# Patient Record
Sex: FEMALE | Race: ASIAN | NOT HISPANIC OR LATINO
[De-identification: names, ages, dates, MRNs, and addresses within clinical notes are randomized per-mention and may not be internally consistent; named-entity substitution may affect disease eponyms.]

---

## 2017-03-21 ENCOUNTER — APPOINTMENT (OUTPATIENT)
Dept: OBGYN | Facility: CLINIC | Age: 42
End: 2017-03-21

## 2017-03-21 VITALS
WEIGHT: 145 LBS | HEIGHT: 60 IN | BODY MASS INDEX: 28.47 KG/M2 | SYSTOLIC BLOOD PRESSURE: 100 MMHG | DIASTOLIC BLOOD PRESSURE: 70 MMHG

## 2017-03-22 LAB — HPV HIGH+LOW RISK DNA PNL CVX: NEGATIVE

## 2017-03-22 RX ORDER — MULTIVITAMIN
CAPSULE ORAL
Refills: 0 | Status: ACTIVE | COMMUNITY
Start: 2017-03-22

## 2017-03-24 LAB — CYTOLOGY CVX/VAG DOC THIN PREP: NORMAL

## 2017-05-13 ENCOUNTER — APPOINTMENT (OUTPATIENT)
Dept: MAMMOGRAPHY | Facility: CLINIC | Age: 42
End: 2017-05-13

## 2017-05-13 ENCOUNTER — APPOINTMENT (OUTPATIENT)
Dept: ULTRASOUND IMAGING | Facility: CLINIC | Age: 42
End: 2017-05-13

## 2017-05-13 ENCOUNTER — OUTPATIENT (OUTPATIENT)
Dept: OUTPATIENT SERVICES | Facility: HOSPITAL | Age: 42
LOS: 1 days | End: 2017-05-13
Payer: COMMERCIAL

## 2017-05-13 DIAGNOSIS — Z00.8 ENCOUNTER FOR OTHER GENERAL EXAMINATION: ICD-10-CM

## 2017-05-13 PROCEDURE — 77063 BREAST TOMOSYNTHESIS BI: CPT

## 2017-05-13 PROCEDURE — 76641 ULTRASOUND BREAST COMPLETE: CPT

## 2017-05-13 PROCEDURE — 76856 US EXAM PELVIC COMPLETE: CPT

## 2017-05-13 PROCEDURE — 76830 TRANSVAGINAL US NON-OB: CPT

## 2017-05-13 PROCEDURE — 77067 SCR MAMMO BI INCL CAD: CPT

## 2017-05-30 ENCOUNTER — APPOINTMENT (OUTPATIENT)
Dept: OBGYN | Facility: CLINIC | Age: 42
End: 2017-05-30

## 2017-05-30 VITALS
DIASTOLIC BLOOD PRESSURE: 66 MMHG | HEIGHT: 60 IN | WEIGHT: 146 LBS | BODY MASS INDEX: 28.66 KG/M2 | SYSTOLIC BLOOD PRESSURE: 102 MMHG

## 2017-05-30 DIAGNOSIS — R10.2 PELVIC AND PERINEAL PAIN: ICD-10-CM

## 2017-05-30 DIAGNOSIS — Z71.89 OTHER SPECIFIED COUNSELING: ICD-10-CM

## 2017-05-31 PROBLEM — Z71.89 ENCOUNTER TO DISCUSS TEST RESULTS: Status: ACTIVE | Noted: 2017-05-31

## 2018-05-01 ENCOUNTER — APPOINTMENT (OUTPATIENT)
Dept: OBGYN | Facility: CLINIC | Age: 43
End: 2018-05-01
Payer: COMMERCIAL

## 2018-05-01 VITALS
DIASTOLIC BLOOD PRESSURE: 60 MMHG | SYSTOLIC BLOOD PRESSURE: 92 MMHG | BODY MASS INDEX: 28.66 KG/M2 | HEIGHT: 60 IN | WEIGHT: 146 LBS

## 2018-05-01 DIAGNOSIS — Z01.419 ENCOUNTER FOR GYNECOLOGICAL EXAMINATION (GENERAL) (ROUTINE) W/OUT ABNORMAL FINDINGS: ICD-10-CM

## 2018-05-01 PROCEDURE — 82270 OCCULT BLOOD FECES: CPT

## 2018-05-01 PROCEDURE — 99396 PREV VISIT EST AGE 40-64: CPT

## 2018-05-03 LAB — HPV HIGH+LOW RISK DNA PNL CVX: NOT DETECTED

## 2018-05-05 LAB — CYTOLOGY CVX/VAG DOC THIN PREP: NORMAL

## 2018-05-28 ENCOUNTER — FORM ENCOUNTER (OUTPATIENT)
Age: 43
End: 2018-05-28

## 2018-05-29 ENCOUNTER — APPOINTMENT (OUTPATIENT)
Dept: MAMMOGRAPHY | Facility: IMAGING CENTER | Age: 43
End: 2018-05-29
Payer: COMMERCIAL

## 2018-05-29 ENCOUNTER — OUTPATIENT (OUTPATIENT)
Dept: OUTPATIENT SERVICES | Facility: HOSPITAL | Age: 43
LOS: 1 days | End: 2018-05-29
Payer: COMMERCIAL

## 2018-05-29 DIAGNOSIS — Z00.8 ENCOUNTER FOR OTHER GENERAL EXAMINATION: ICD-10-CM

## 2018-05-29 PROCEDURE — 77063 BREAST TOMOSYNTHESIS BI: CPT

## 2018-05-29 PROCEDURE — 77067 SCR MAMMO BI INCL CAD: CPT

## 2018-05-29 PROCEDURE — 77063 BREAST TOMOSYNTHESIS BI: CPT | Mod: 26

## 2018-05-29 PROCEDURE — 77067 SCR MAMMO BI INCL CAD: CPT | Mod: 26

## 2018-05-30 DIAGNOSIS — R92.2 INCONCLUSIVE MAMMOGRAM: ICD-10-CM

## 2018-06-06 ENCOUNTER — APPOINTMENT (OUTPATIENT)
Dept: ORTHOPEDIC SURGERY | Facility: CLINIC | Age: 43
End: 2018-06-06

## 2018-06-11 ENCOUNTER — APPOINTMENT (OUTPATIENT)
Dept: ORTHOPEDIC SURGERY | Facility: CLINIC | Age: 43
End: 2018-06-11
Payer: COMMERCIAL

## 2018-06-11 DIAGNOSIS — M22.2X2 PATELLOFEMORAL DISORDERS, RIGHT KNEE: ICD-10-CM

## 2018-06-11 DIAGNOSIS — M22.2X1 PATELLOFEMORAL DISORDERS, RIGHT KNEE: ICD-10-CM

## 2018-06-11 PROCEDURE — 73110 X-RAY EXAM OF WRIST: CPT | Mod: RT

## 2018-06-11 PROCEDURE — 99203 OFFICE O/P NEW LOW 30 MIN: CPT

## 2018-06-11 PROCEDURE — 73564 X-RAY EXAM KNEE 4 OR MORE: CPT | Mod: 50

## 2018-06-12 PROBLEM — M22.2X1 PATELLOFEMORAL SYNDROME, BILATERAL: Status: ACTIVE | Noted: 2018-06-12

## 2018-06-19 ENCOUNTER — APPOINTMENT (OUTPATIENT)
Dept: OBGYN | Facility: CLINIC | Age: 43
End: 2018-06-19

## 2018-06-19 VITALS
BODY MASS INDEX: 29.25 KG/M2 | WEIGHT: 149 LBS | SYSTOLIC BLOOD PRESSURE: 120 MMHG | HEIGHT: 60 IN | DIASTOLIC BLOOD PRESSURE: 76 MMHG

## 2018-06-19 RX ORDER — LEVOTHYROXINE SODIUM 200 MCG
VIAL (EA) INTRAVENOUS
Refills: 0 | Status: ACTIVE | COMMUNITY

## 2018-06-27 ENCOUNTER — APPOINTMENT (OUTPATIENT)
Dept: ORTHOPEDIC SURGERY | Facility: CLINIC | Age: 43
End: 2018-06-27
Payer: COMMERCIAL

## 2018-06-27 DIAGNOSIS — M54.5 LOW BACK PAIN: ICD-10-CM

## 2018-06-27 PROCEDURE — 72110 X-RAY EXAM L-2 SPINE 4/>VWS: CPT

## 2018-06-27 PROCEDURE — 99214 OFFICE O/P EST MOD 30 MIN: CPT

## 2018-06-27 PROCEDURE — 72170 X-RAY EXAM OF PELVIS: CPT | Mod: 59

## 2018-06-28 VITALS
SYSTOLIC BLOOD PRESSURE: 105 MMHG | DIASTOLIC BLOOD PRESSURE: 72 MMHG | HEIGHT: 60 IN | HEART RATE: 67 BPM | BODY MASS INDEX: 27.88 KG/M2 | WEIGHT: 142 LBS

## 2018-06-29 PROBLEM — M54.5 ACUTE LOW BACK PAIN WITHOUT SCIATICA, UNSPECIFIED BACK PAIN LATERALITY: Status: ACTIVE | Noted: 2018-06-29

## 2018-07-17 ENCOUNTER — APPOINTMENT (OUTPATIENT)
Dept: ORTHOPEDIC SURGERY | Facility: CLINIC | Age: 43
End: 2018-07-17

## 2018-10-16 ENCOUNTER — APPOINTMENT (OUTPATIENT)
Dept: ULTRASOUND IMAGING | Facility: IMAGING CENTER | Age: 43
End: 2018-10-16
Payer: COMMERCIAL

## 2018-10-16 ENCOUNTER — OUTPATIENT (OUTPATIENT)
Dept: OUTPATIENT SERVICES | Facility: HOSPITAL | Age: 43
LOS: 1 days | End: 2018-10-16
Payer: COMMERCIAL

## 2018-10-16 DIAGNOSIS — Z00.8 ENCOUNTER FOR OTHER GENERAL EXAMINATION: ICD-10-CM

## 2018-10-16 PROCEDURE — 76641 ULTRASOUND BREAST COMPLETE: CPT | Mod: 26,50

## 2018-10-16 PROCEDURE — 76641 ULTRASOUND BREAST COMPLETE: CPT

## 2019-03-19 ENCOUNTER — APPOINTMENT (OUTPATIENT)
Dept: OBGYN | Facility: CLINIC | Age: 44
End: 2019-03-19

## 2019-08-04 ENCOUNTER — EMERGENCY (EMERGENCY)
Facility: HOSPITAL | Age: 44
LOS: 0 days | Discharge: HOME | End: 2019-08-04
Attending: EMERGENCY MEDICINE
Payer: COMMERCIAL

## 2019-08-04 VITALS
RESPIRATION RATE: 19 BRPM | OXYGEN SATURATION: 100 % | DIASTOLIC BLOOD PRESSURE: 79 MMHG | TEMPERATURE: 98 F | SYSTOLIC BLOOD PRESSURE: 129 MMHG | HEART RATE: 77 BPM | WEIGHT: 145.06 LBS

## 2019-08-04 VITALS
HEART RATE: 79 BPM | OXYGEN SATURATION: 100 % | TEMPERATURE: 98 F | DIASTOLIC BLOOD PRESSURE: 70 MMHG | SYSTOLIC BLOOD PRESSURE: 122 MMHG | RESPIRATION RATE: 18 BRPM

## 2019-08-04 DIAGNOSIS — R07.81 PLEURODYNIA: ICD-10-CM

## 2019-08-04 DIAGNOSIS — M25.50 PAIN IN UNSPECIFIED JOINT: ICD-10-CM

## 2019-08-04 DIAGNOSIS — M25.561 PAIN IN RIGHT KNEE: ICD-10-CM

## 2019-08-04 DIAGNOSIS — E03.9 HYPOTHYROIDISM, UNSPECIFIED: ICD-10-CM

## 2019-08-04 DIAGNOSIS — M25.562 PAIN IN LEFT KNEE: ICD-10-CM

## 2019-08-04 PROCEDURE — 99283 EMERGENCY DEPT VISIT LOW MDM: CPT

## 2019-08-04 PROCEDURE — 73564 X-RAY EXAM KNEE 4 OR MORE: CPT | Mod: 26,50

## 2019-08-04 PROCEDURE — 71101 X-RAY EXAM UNILAT RIBS/CHEST: CPT | Mod: 26,LT

## 2019-08-04 NOTE — ED ADULT TRIAGE NOTE - CHIEF COMPLAINT QUOTE
patient c/o of LUQ abdominal pain more intense with inspiration and L arm pain  started 3 days ago , denied back pain denied chest pain denied headache , c/o of bilateral knee pain , patient had a fall 3 days go , denied hitting head  no LOC

## 2019-08-04 NOTE — ED PROVIDER NOTE - MUSCULOSKELETAL MINIMAL EXAM
L. lower rib pain at bottom rib. No paradoxical movement. No abdominal pain. Knees tender bilaterally. No swelling, no effusion. FROM. Able to bear weight.

## 2019-08-04 NOTE — ED PROVIDER NOTE - OBJECTIVE STATEMENT
Pt is a 43 yo lady with a pmhx of hypothyroid who presents to the ED with fall. She tripped next to a concrete barrier Wednesday and landed on her knees and hit her rib on the barrier. No head strike, no loc, no syncope. Has pain at bilateral ribs and at the bottom of her L. rib. No chest pain, no sob, no n/v/d.

## 2019-08-04 NOTE — ED ADULT NURSE NOTE - OBJECTIVE STATEMENT
pt A&Ox4 with c/o of left abdominal pain and b/l knee pain after falling on side walk over something. bruise to right knee and scrap to left knee.  PMH Hypothyroidism

## 2019-11-21 ENCOUNTER — EMERGENCY (EMERGENCY)
Facility: HOSPITAL | Age: 44
LOS: 1 days | Discharge: HOME | End: 2019-11-21
Admitting: EMERGENCY MEDICINE
Payer: COMMERCIAL

## 2019-11-21 VITALS
TEMPERATURE: 98 F | RESPIRATION RATE: 16 BRPM | SYSTOLIC BLOOD PRESSURE: 115 MMHG | OXYGEN SATURATION: 100 % | HEART RATE: 80 BPM | DIASTOLIC BLOOD PRESSURE: 74 MMHG

## 2019-11-21 PROCEDURE — 99283 EMERGENCY DEPT VISIT LOW MDM: CPT

## 2019-11-21 PROCEDURE — 73130 X-RAY EXAM OF HAND: CPT | Mod: 26,LT

## 2019-11-21 RX ORDER — TETANUS TOXOID, REDUCED DIPHTHERIA TOXOID AND ACELLULAR PERTUSSIS VACCINE, ADSORBED 5; 2.5; 8; 8; 2.5 [IU]/.5ML; [IU]/.5ML; UG/.5ML; UG/.5ML; UG/.5ML
0.5 SUSPENSION INTRAMUSCULAR ONCE
Refills: 0 | Status: COMPLETED | OUTPATIENT
Start: 2019-11-21 | End: 2019-11-21

## 2019-11-21 RX ORDER — ACETAMINOPHEN 500 MG
975 TABLET ORAL ONCE
Refills: 0 | Status: COMPLETED | OUTPATIENT
Start: 2019-11-21 | End: 2019-11-21

## 2019-11-21 RX ORDER — LIDOCAINE HCL 20 MG/ML
2 VIAL (ML) INJECTION ONCE
Refills: 0 | Status: COMPLETED | OUTPATIENT
Start: 2019-11-21 | End: 2019-11-21

## 2019-11-21 RX ADMIN — TETANUS TOXOID, REDUCED DIPHTHERIA TOXOID AND ACELLULAR PERTUSSIS VACCINE, ADSORBED 0.5 MILLILITER(S): 5; 2.5; 8; 8; 2.5 SUSPENSION INTRAMUSCULAR at 12:32

## 2019-11-21 RX ADMIN — Medication 2 MILLILITER(S): at 13:51

## 2019-11-21 RX ADMIN — Medication 975 MILLIGRAM(S): at 12:32

## 2019-11-21 NOTE — ED PROVIDER NOTE - OBJECTIVE STATEMENT
HPI: Patient is a 44 y.o female with PMHx of thyroid dz who presents to ED c/o Lt hand laceration and pain s/p cutting an avocado. As per patient states that she was cutting an avocado when the knife went through cutting her Lt palm, pt noticed increasing pain and swelling to hand. Pt unsure if tdap UTD. Denies numbness or tingling, weakness, other trauma/falls or any other complaints. Pt Rt hand dominant. HPI: Patient is a 44 y.o female with PMHx of thyroid dz who presents to ED c/o Lt hand laceration and pain s/p cutting an avocado. As per patient states that she was cutting an avocado when the knife went through cutting her Lt palm, pt noticed increasing pain and swelling to hand. Difficulty and pain with ROM of 2nd digit. Pt unsure if tdap UTD. Denies numbness or tingling, weakness, other trauma/falls or any other complaints. Pt Rt hand dominant.

## 2019-11-21 NOTE — ED PROVIDER NOTE - PATIENT PORTAL LINK FT
You can access the FollowMyHealth Patient Portal offered by [facility] by registering at the following website: [email or web address]. By joining [email or web address]’s FollowMyHealth portal, you will also be able to view your health information using other applications (apps) compatible with our system.

## 2019-11-21 NOTE — ED PROVIDER NOTE - CARE PROVIDER_API CALL
La Nena Mosquera (MD)  Surgery  [address], [address]  [place] [address]  Phone: [phone]  Fax: [phone]  Follow Up Time: 1-3 Days

## 2019-11-21 NOTE — ED PROVIDER NOTE - CLINICAL SUMMARY MEDICAL DECISION MAKING FREE TEXT BOX
Patient is a 44 y.o female with PMHx of thyroid dz who presents to ED c/o Lt hand laceration and pain s/p cutting an avocado. DDx- laceration, r/o fx of hand. Plan- tdap, analgesic, xray, wound care/lac repair.

## 2019-11-21 NOTE — ED ADULT NURSE NOTE - SUICIDE SCREENING QUESTION 1
Will schedule surgery. Pt will here from juwan Gates. She will get approval for surgery& co-ordinate with pt.   No

## 2019-11-21 NOTE — ED PROVIDER NOTE - PHYSICAL EXAMINATION
Vital signs reviewed.   CONSTITUTIONAL: Well-appearing; well-nourished; in no apparent distress. Non-toxic appearing.   HEAD: Normocephalic, atraumatic.  EYES: PERRL, EOM intact, conjunctiva and sclera WNL.  ENT: normal nose; no rhinorrhea;   NECK: Trachea midline   RESP: NAD  EXT/MS: moves all extremities; distal pulses are normal. +Swelling to dorsum of Lt hand. +ttp over area.   SKIN: Normal for age and race; warm; dry; good turgor; +approx. 1.5 cm laceration noted to Lt palm of hand. +swelling of dorsum of hand. No crepitus noted. No active bleeding appreciated.   NEURO: Awake, alert, oriented x 3, no gross deficits, some limited rom of hand due to pain, otherwise no motor or sensory deficit noted.  PSYCH: Normal mood; appropriate affect. Vital signs reviewed.   CONSTITUTIONAL: Well-appearing; well-nourished; in no apparent distress. Non-toxic appearing.   HEAD: Normocephalic, atraumatic.  EYES: PERRL, EOM intact, conjunctiva and sclera WNL.  ENT: normal nose; no rhinorrhea;   NECK: Trachea midline   RESP: NAD  EXT/MS: moves all extremities; distal pulses are normal. +Swelling to dorsum of Lt hand. +ttp over area. Unable to Flex 2nd digit. Able to extend.   SKIN: Normal for age and race; warm; dry; good turgor; +approx. 1-1.5 cm laceration noted to Lt palm of hand near thenar eminence. +swelling of dorsum of hand over 2nd mcp. No crepitus noted. No active bleeding appreciated.   NEURO: Awake, alert, oriented x 3, no gross deficits, some limited rom of 2nd digit of hand, otherwise no motor or sensory deficit noted.  PSYCH: Normal mood; appropriate affect.

## 2019-11-21 NOTE — CONSULT NOTE ADULT - SUBJECTIVE AND OBJECTIVE BOX
45 yo RHD F was on her lunch break at work cutting an avocado when the knife slipped and she suffered a laceration to her left volar palm.  Unable to flex index finger.  Plastics requested for consult.    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    Wound washed out, repaired, and splinted as per procedure note  Keep splint clean and dry  Will need tendon repair in OR  Follow up for surgery    La Nena Mosquera MD  Plastic Surgery

## 2019-11-21 NOTE — ED ADULT NURSE NOTE - NSIMPLEMENTINTERV_GEN_ALL_ED
Implemented All Universal Safety Interventions:  [place] to call system. Call bell, personal items and telephone within reach. Instruct patient to call for assistance. Room bathroom lighting operational. Non-slip footwear when patient is off stretcher. Physically safe environment: no spills, clutter or unnecessary equipment. Stretcher in lowest position, wheels locked, appropriate side rails in place.

## 2019-11-21 NOTE — ED ADULT NURSE NOTE - OBJECTIVE STATEMENT
Pt received to intake room 13. Pt comes to ED as a rapid response, works in peds pharmacy, rapid response was initiated after pt cut her hand by accident while cutting an avocado. Laceration noted extending across left hand. Denies LOC, dizziness, headache, weakness, chest pain, dyspnea, N/V/D, chills, fever, chest pain, palpitations. Respirations are even & unlabored. Pt is A&Ox4, ambulates independently.

## 2019-11-21 NOTE — ED PROVIDER NOTE - NSFOLLOWUPINSTRUCTIONS_ED_ALL_ED_FT
Patient advised to follow up with PRIMARY CARE DOCTOR 1-2 DAYS AND PLASTIC SURGEON WITHIN WEEK FOR TENDON REPAIR  and told to return to the emergency department immediately for any new or concerning symptoms such as fevers, chills, worsening pain, numbness or tingling OR ANY OTHER COMPLAINTS. Patient agrees with plan.      Keep splint on at all times until follow up   Keep area clean/dry and intact   Take tylenol as needed for pain   Rest, avoid heavy lifting      Continue all previously prescribed medications as directed unless otherwise instructed.  Follow up with your primary care physician in 48-72 hours- bring copies of your results.  Return to the ER for worsening or persistent symptoms, and/or ANY NEW OR CONCERNING SYMPTOMS. If you have issues obtaining follow up, please call: [phone] to obtain a doctor or specialist who takes your insurance in your area.  You may call [phone] to make an appointment with the internal medicine clinic.

## 2019-11-21 NOTE — ED PROVIDER NOTE - PROGRESS NOTE DETAILS
ERIC Mcconnell- Spoke to oncall plastic surgeon aware of flexor tendon injury and laceration, states will evaluate patient. Pt updated on plan. ERIC Mcconnell- Patient seen and evaluated by Plastics, lac repaired and hand splinted, state to have patient f/u in office for tendon repair. Pt stable for dc home and outpatient f/u with hand. All questions and concerns addressed. Wound care and splint instructions given.

## 2019-11-29 ENCOUNTER — OUTPATIENT (OUTPATIENT)
Dept: OUTPATIENT SERVICES | Facility: HOSPITAL | Age: 44
LOS: 1 days | End: 2019-11-29

## 2019-11-29 VITALS
RESPIRATION RATE: 16 BRPM | DIASTOLIC BLOOD PRESSURE: 80 MMHG | TEMPERATURE: 99 F | HEART RATE: 78 BPM | SYSTOLIC BLOOD PRESSURE: 120 MMHG | OXYGEN SATURATION: 98 % | HEIGHT: 61 IN | WEIGHT: 143.08 LBS

## 2019-11-29 DIAGNOSIS — M79.642 PAIN IN LEFT HAND: ICD-10-CM

## 2019-11-29 DIAGNOSIS — Z98.891 HISTORY OF UTERINE SCAR FROM PREVIOUS SURGERY: Chronic | ICD-10-CM

## 2019-11-29 DIAGNOSIS — Z98.51 TUBAL LIGATION STATUS: Chronic | ICD-10-CM

## 2019-11-29 LAB
HCT VFR BLD CALC: 39.3 % — SIGNIFICANT CHANGE UP (ref 34.5–45)
HGB BLD-MCNC: 13.1 G/DL — SIGNIFICANT CHANGE UP (ref 11.5–15.5)
MCHC RBC-ENTMCNC: 29.6 PG — SIGNIFICANT CHANGE UP (ref 27–34)
MCHC RBC-ENTMCNC: 33.3 % — SIGNIFICANT CHANGE UP (ref 32–36)
MCV RBC AUTO: 88.9 FL — SIGNIFICANT CHANGE UP (ref 80–100)
NRBC # FLD: 0 K/UL — SIGNIFICANT CHANGE UP (ref 0–0)
PLATELET # BLD AUTO: 299 K/UL — SIGNIFICANT CHANGE UP (ref 150–400)
PMV BLD: 10.3 FL — SIGNIFICANT CHANGE UP (ref 7–13)
RBC # BLD: 4.42 M/UL — SIGNIFICANT CHANGE UP (ref 3.8–5.2)
RBC # FLD: 12.3 % — SIGNIFICANT CHANGE UP (ref 10.3–14.5)
WBC # BLD: 7.65 K/UL — SIGNIFICANT CHANGE UP (ref 3.8–10.5)
WBC # FLD AUTO: 7.65 K/UL — SIGNIFICANT CHANGE UP (ref 3.8–10.5)

## 2019-11-29 RX ORDER — CHOLECALCIFEROL (VITAMIN D3) 125 MCG
1 CAPSULE ORAL
Qty: 0 | Refills: 0 | DISCHARGE

## 2019-11-29 RX ORDER — LEVOTHYROXINE SODIUM 125 MCG
1 TABLET ORAL
Qty: 0 | Refills: 0 | DISCHARGE

## 2019-11-29 NOTE — H&P PST ADULT - NSICDXFAMILYHX_GEN_ALL_CORE_FT
FAMILY HISTORY:  Father  Still living? Unknown  Family history of diabetes mellitus (DM), Age at diagnosis: Age Unknown    Mother  Still living? Unknown  Family history of diabetes mellitus (DM), Age at diagnosis: Age Unknown  FH: ovarian cancer, Age at diagnosis: Age Unknown

## 2019-11-29 NOTE — H&P PST ADULT - NEGATIVE BREAST SYMPTOMS
Michell Mcdonnell  Patient's preferred method of contact:    Home Phone: [phone] (home)  Okay to leave a message containing results? Yes    denies known Latex allergy or symptoms of Latex sensitivity.  Medications: medications verified and updated       no breast lump L/no breast tenderness R/no breast lump R/no breast tenderness L

## 2019-11-29 NOTE — H&P PST ADULT - HISTORY OF PRESENT ILLNESS
44 year old female with laceration to left hand which occurred last week. Pt had sutures placed and a splint placed. Pt presents today for presurgical evaluation for .. 44 year old female with laceration to left hand which occurred last week. Pt had sutures placed and a splint placed. Pt presents today for presurgical evaluation for Left Hand Exploration, Possible Tendon Possible Nerve Repair scheduled on 12/2/19.

## 2019-11-29 NOTE — H&P PST ADULT - MUSCULOSKELETAL
details… detailed exam no joint warmth/no calf tenderness/normal strength/ROM intact/no joint swelling/no joint erythema no joint warmth/no calf tenderness/no joint erythema/normal strength

## 2019-11-29 NOTE — H&P PST ADULT - NSICDXPROBLEM_GEN_ALL_CORE_FT
PROBLEM DIAGNOSES  Problem: Pain in left hand  Assessment and Plan: Pt scheduled for surgery on 12/2/19.  Pre-op instructions provided. Pt verbalized understanding.   Pepcid provided for GI prophylaxis.   Pt instructed to take his Levothyroxine the morning of surgery.

## 2019-12-01 ENCOUNTER — TRANSCRIPTION ENCOUNTER (OUTPATIENT)
Age: 44
End: 2019-12-01

## 2019-12-02 ENCOUNTER — OUTPATIENT (OUTPATIENT)
Dept: OUTPATIENT SERVICES | Facility: HOSPITAL | Age: 44
LOS: 1 days | Discharge: HOME | End: 2019-12-02

## 2019-12-02 VITALS
HEART RATE: 72 BPM | WEIGHT: 143.08 LBS | HEIGHT: 61 IN | OXYGEN SATURATION: 100 % | DIASTOLIC BLOOD PRESSURE: 65 MMHG | SYSTOLIC BLOOD PRESSURE: 115 MMHG | RESPIRATION RATE: 18 BRPM | TEMPERATURE: 98 F

## 2019-12-02 VITALS
OXYGEN SATURATION: 98 % | DIASTOLIC BLOOD PRESSURE: 68 MMHG | TEMPERATURE: 98 F | RESPIRATION RATE: 16 BRPM | HEART RATE: 71 BPM | SYSTOLIC BLOOD PRESSURE: 122 MMHG

## 2019-12-02 DIAGNOSIS — Z98.51 TUBAL LIGATION STATUS: Chronic | ICD-10-CM

## 2019-12-02 DIAGNOSIS — M79.642 PAIN IN LEFT HAND: ICD-10-CM

## 2019-12-02 DIAGNOSIS — Z98.891 HISTORY OF UTERINE SCAR FROM PREVIOUS SURGERY: Chronic | ICD-10-CM

## 2019-12-02 RX ORDER — SODIUM CHLORIDE 9 MG/ML
1000 INJECTION, SOLUTION INTRAVENOUS
Refills: 0 | Status: DISCONTINUED | OUTPATIENT
Start: 2019-12-02 | End: 2019-12-18

## 2019-12-02 NOTE — ASU DISCHARGE PLAN (ADULT/PEDIATRIC) - CARE PROVIDER_API CALL
Sanchez Underwood)  Orthopaedic Surgery; Surgery of the Hand  [address]  [place] [address]  Phone: [phone]  Fax: [phone]  Follow Up Time:

## 2019-12-02 NOTE — ASU DISCHARGE PLAN (ADULT/PEDIATRIC) - PATIENT EDUCATION MATERIALS PROVIED
Pre-printed instructions given for other (specify)/info sheet given/Provider pre-printed instructions given

## 2019-12-02 NOTE — ASU DISCHARGE PLAN (ADULT/PEDIATRIC) - CALL YOUR DOCTOR IF YOU HAVE ANY OF THE FOLLOWING:
Numbness, tingling, color or temperature change to extremity/Nausea and vomiting that does not stop/Inability to tolerate liquids or foods/Increased irritability or sluggishness/Fever greater than (need to indicate Fahrenheit or Celsius)/Wound/Surgical Site with redness, or foul smelling discharge or pus/Pain not relieved by Medications/Bleeding that does not stop

## 2019-12-02 NOTE — ASU PREOPERATIVE ASSESSMENT, ADULT ([facility]ARK ONLY) - PROCEDURE
No acute events throughout day. See vital signs and assessments in flowsheets. See below for updates on today's progress.     Pulmonary:  Breath sounds clear, nasal cannula at 2L  Cardiovascular: HR , NS rhythm    Neurological: alert and oriented, follows commands, pulses 2+; neuro checks hourly     Gastrointestinal: WNL, no BM this shift    Genitourinary: purewick catheter in place; changed at 11:30.     Endocrine: Newly diagnosed diabetic type 2, insulin AC/HS    Integumentary/Other: no skin break down    Infusions: no gtts  20 LAC  18 RAC        Patient progressing towards goals as tolerated, plan of care communicated and reviewed with patient and family. All concerns addressed. Will continue to monitor.      left hand exploration poss tendon nerve repair

## 2020-04-25 ENCOUNTER — MESSAGE (OUTPATIENT)
Age: 45
End: 2020-04-25

## 2020-05-07 LAB
SARS-COV-2 IGG SERPL IA-ACNC: 0 INDEX
SARS-COV-2 IGG SERPL QL IA: NEGATIVE

## 2020-11-10 PROBLEM — E03.9 HYPOTHYROIDISM, UNSPECIFIED: Chronic | Status: ACTIVE | Noted: 2019-11-29

## 2020-11-18 ENCOUNTER — APPOINTMENT (OUTPATIENT)
Dept: OBGYN | Facility: CLINIC | Age: 45
End: 2020-11-18

## 2021-01-13 ENCOUNTER — APPOINTMENT (OUTPATIENT)
Dept: OBGYN | Facility: CLINIC | Age: 46
End: 2021-01-13
Payer: COMMERCIAL

## 2021-01-13 PROCEDURE — 99072 ADDL SUPL MATRL&STAF TM PHE: CPT

## 2021-01-13 PROCEDURE — 99212 OFFICE O/P EST SF 10 MIN: CPT

## 2021-03-02 ENCOUNTER — APPOINTMENT (OUTPATIENT)
Dept: OBGYN | Facility: CLINIC | Age: 46
End: 2021-03-02
Payer: COMMERCIAL

## 2021-03-02 VITALS
BODY MASS INDEX: 26.31 KG/M2 | DIASTOLIC BLOOD PRESSURE: 80 MMHG | WEIGHT: 134 LBS | HEIGHT: 60 IN | TEMPERATURE: 97.2 F | SYSTOLIC BLOOD PRESSURE: 110 MMHG

## 2021-03-02 DIAGNOSIS — N89.8 OTHER SPECIFIED NONINFLAMMATORY DISORDERS OF VAGINA: ICD-10-CM

## 2021-03-02 PROCEDURE — 99213 OFFICE O/P EST LOW 20 MIN: CPT

## 2021-03-02 PROCEDURE — 99072 ADDL SUPL MATRL&STAF TM PHE: CPT

## 2021-03-03 LAB
CANDIDA VAG CYTO: NOT DETECTED
G VAGINALIS+PREV SP MTYP VAG QL MICRO: NOT DETECTED
T VAGINALIS VAG QL WET PREP: NOT DETECTED

## 2021-03-04 PROBLEM — N89.8 VAGINAL IRRITATION: Status: ACTIVE | Noted: 2021-03-04

## 2021-03-05 NOTE — HISTORY OF PRESENT ILLNESS
[FreeTextEntry1] : 47 yo present with complaint of vaginal discomfort and swelling x 2 days.  Denies vaginal discharge, itching, or odor. [LMPDate] : 2/6/21

## 2021-03-05 NOTE — PHYSICAL EXAM
[Appropriately responsive] : appropriately responsive [Alert] : alert [No Acute Distress] : no acute distress [Oriented x3] : oriented x3 [Labia Majora] : normal [Labia Minora] : normal [Normal] : normal [Uterine Adnexae] : normal [FreeTextEntry2] : Swelling noted in clitoral area

## 2021-03-05 NOTE — DISCUSSION/SUMMARY
[FreeTextEntry1] : Avoid douching, sugary foods, constrictive clothing, perfumed feminine products\par Keep area clean and dry\par Change pads and tampons every 4-8 hours\par Use mild unscented soap or plain water to clean vagina at least once daily\par Wear cotton underwear\par Wipe carefully after bowel movements\par Use of lubricant during sexual activity

## 2021-04-02 ENCOUNTER — NON-APPOINTMENT (OUTPATIENT)
Age: 46
End: 2021-04-02

## 2021-12-15 ENCOUNTER — APPOINTMENT (OUTPATIENT)
Dept: UROLOGY | Facility: CLINIC | Age: 46
End: 2021-12-15
Payer: COMMERCIAL

## 2021-12-15 DIAGNOSIS — R31.29 OTHER MICROSCOPIC HEMATURIA: ICD-10-CM

## 2021-12-15 PROCEDURE — 99204 OFFICE O/P NEW MOD 45 MIN: CPT

## 2021-12-15 NOTE — HISTORY OF PRESENT ILLNESS
[FreeTextEntry1] : patietn with hx of microheamturia\par eval for this by GYN 5 years ago with CT and cysto : negative\par denies tobacco use or stone hx\par no menses\par c/o vag dryness /itching \par + constipation , adtmis to incrased water \par recent urine in april and October this year 2021 showed 11-30 red cells at both times\par here fr jeanine phippsal

## 2021-12-15 NOTE — ASSESSMENT
[FreeTextEntry1] : patietn with hx of microheamturia\par eval for this by GYN 5 years ago with CT and cysto : negative\par denies tobacco use or stone hx\par no menses\par c/o vag dryness /itching \par + constipation , adtmis to incrased water \par recent urine in april and October this year 2021 showed 11-30 red cells at both times\par here fr furtehr eval \par 1) chec urine again \par 2) CT urogram\par 3) CYSTO \par 4) refer to GYN for local estrogen cream for vginal dryness and microhematuria if eval negatiev

## 2021-12-15 NOTE — PHYSICAL EXAM
[General Appearance - Well Developed] : well developed [General Appearance - Well Nourished] : well nourished [Normal Appearance] : normal appearance [Well Groomed] : well groomed [General Appearance - In No Acute Distress] : no acute distress [Edema] : no peripheral edema [Respiration, Rhythm And Depth] : normal respiratory rhythm and effort [Exaggerated Use Of Accessory Muscles For Inspiration] : no accessory muscle use [Abdomen Soft] : soft [Abdomen Tenderness] : non-tender [Abdomen Mass (___ Cm)] : no abdominal mass palpated [Abdomen Hernia] : no hernia was discovered [Costovertebral Angle Tenderness] : no ~M costovertebral angle tenderness [Urethral Meatus] : normal urethra [External Female Genitalia] : normal external genitalia [Vagina] : normal vaginal exam [FreeTextEntry1] : soft urethra, urethra and bladder not tender, + vagnal dryness, no discharge or prolapse, no stool felt vag n rectal vault [Normal Station and Gait] : the gait and station were normal for the patient's age [] : no rash [No Focal Deficits] : no focal deficits [Oriented To Time, Place, And Person] : oriented to person, place, and time [Affect] : the affect was normal [Mood] : the mood was normal [Not Anxious] : not anxious [Cervical Lymph Nodes Enlarged Posterior Bilaterally] : posterior cervical [Cervical Lymph Nodes Enlarged Anterior Bilaterally] : anterior cervical [Supraclavicular Lymph Nodes Enlarged Bilaterally] : supraclavicular

## 2021-12-15 NOTE — REVIEW OF SYSTEMS
[Negative] : Heme/Lymph [Chills] : chills [Feeling Tired] : feeling tired [Earache] : earache [Abdominal Pain] : abdominal pain [Constipation] : constipation [see HPI] : see HPI [Itching] : itching

## 2021-12-16 LAB
APPEARANCE: CLEAR
BACTERIA: NEGATIVE
BILIRUBIN URINE: NEGATIVE
BLOOD URINE: ABNORMAL
COLOR: COLORLESS
GLUCOSE QUALITATIVE U: NEGATIVE
HYALINE CASTS: 0 /LPF
KETONES URINE: NEGATIVE
LEUKOCYTE ESTERASE URINE: NEGATIVE
MICROSCOPIC-UA: NORMAL
NITRITE URINE: NEGATIVE
PH URINE: 6.5
PROTEIN URINE: NORMAL
RED BLOOD CELLS URINE: 1 /HPF
SPECIFIC GRAVITY URINE: 1.01
SQUAMOUS EPITHELIAL CELLS: 1 /HPF
UROBILINOGEN URINE: NORMAL
WHITE BLOOD CELLS URINE: 1 /HPF

## 2021-12-17 DIAGNOSIS — Z01.818 ENCOUNTER FOR OTHER PREPROCEDURAL EXAMINATION: ICD-10-CM

## 2021-12-17 LAB — BACTERIA UR CULT: NORMAL

## 2021-12-18 ENCOUNTER — APPOINTMENT (OUTPATIENT)
Dept: DISASTER EMERGENCY | Facility: CLINIC | Age: 46
End: 2021-12-18

## 2021-12-22 ENCOUNTER — APPOINTMENT (OUTPATIENT)
Dept: PULMONOLOGY | Facility: CLINIC | Age: 46
End: 2021-12-22

## 2021-12-22 ENCOUNTER — NON-APPOINTMENT (OUTPATIENT)
Age: 46
End: 2021-12-22

## 2022-01-02 ENCOUNTER — OUTPATIENT (OUTPATIENT)
Dept: OUTPATIENT SERVICES | Facility: HOSPITAL | Age: 47
LOS: 1 days | End: 2022-01-02
Payer: COMMERCIAL

## 2022-01-02 ENCOUNTER — APPOINTMENT (OUTPATIENT)
Dept: CT IMAGING | Facility: IMAGING CENTER | Age: 47
End: 2022-01-02
Payer: COMMERCIAL

## 2022-01-02 DIAGNOSIS — Z98.51 TUBAL LIGATION STATUS: Chronic | ICD-10-CM

## 2022-01-02 DIAGNOSIS — Z00.8 ENCOUNTER FOR OTHER GENERAL EXAMINATION: ICD-10-CM

## 2022-01-02 DIAGNOSIS — R31.29 OTHER MICROSCOPIC HEMATURIA: ICD-10-CM

## 2022-01-02 DIAGNOSIS — Z98.891 HISTORY OF UTERINE SCAR FROM PREVIOUS SURGERY: Chronic | ICD-10-CM

## 2022-01-02 PROCEDURE — 74178 CT ABD&PLV WO CNTR FLWD CNTR: CPT

## 2022-01-02 PROCEDURE — 74178 CT ABD&PLV WO CNTR FLWD CNTR: CPT | Mod: 26

## 2022-01-12 ENCOUNTER — APPOINTMENT (OUTPATIENT)
Dept: PEDIATRIC ALLERGY IMMUNOLOGY | Facility: CLINIC | Age: 47
End: 2022-01-12

## 2022-01-21 ENCOUNTER — APPOINTMENT (OUTPATIENT)
Dept: PULMONOLOGY | Facility: CLINIC | Age: 47
End: 2022-01-21

## 2022-02-23 ENCOUNTER — APPOINTMENT (OUTPATIENT)
Dept: UROLOGY | Facility: CLINIC | Age: 47
End: 2022-02-23

## 2022-02-28 ENCOUNTER — APPOINTMENT (OUTPATIENT)
Dept: SURGERY | Facility: CLINIC | Age: 47
End: 2022-02-28
Payer: COMMERCIAL

## 2022-02-28 VITALS
WEIGHT: 128 LBS | RESPIRATION RATE: 17 BRPM | TEMPERATURE: 98.2 F | HEIGHT: 60 IN | BODY MASS INDEX: 25.13 KG/M2 | OXYGEN SATURATION: 99 % | SYSTOLIC BLOOD PRESSURE: 127 MMHG | HEART RATE: 72 BPM | DIASTOLIC BLOOD PRESSURE: 80 MMHG

## 2022-02-28 DIAGNOSIS — Z83.79 FAMILY HISTORY OF OTHER DISEASES OF THE DIGESTIVE SYSTEM: ICD-10-CM

## 2022-02-28 DIAGNOSIS — K60.2 ANAL FISSURE, UNSPECIFIED: ICD-10-CM

## 2022-02-28 DIAGNOSIS — Z80.41 FAMILY HISTORY OF MALIGNANT NEOPLASM OF OVARY: ICD-10-CM

## 2022-02-28 PROCEDURE — 99204 OFFICE O/P NEW MOD 45 MIN: CPT | Mod: 25

## 2022-02-28 PROCEDURE — 46600 DIAGNOSTIC ANOSCOPY SPX: CPT

## 2022-02-28 RX ORDER — NITROGLYCERIN 20 MG/G
2 OINTMENT TOPICAL
Qty: 1 | Refills: 3 | Status: ACTIVE | COMMUNITY
Start: 2022-02-28 | End: 1900-01-01

## 2022-02-28 RX ORDER — SENNOSIDES 8.6 MG TABLETS 8.6 MG/1
TABLET ORAL
Refills: 0 | Status: ACTIVE | COMMUNITY

## 2022-02-28 RX ORDER — HYDROCORTISONE 25 MG/G
2.5 CREAM TOPICAL
Refills: 0 | Status: ACTIVE | COMMUNITY

## 2022-02-28 RX ORDER — FAMOTIDINE 40 MG/1
TABLET, FILM COATED ORAL
Refills: 0 | Status: ACTIVE | COMMUNITY

## 2022-02-28 RX ORDER — TRIAMCINOLONE ACETONIDE 1 MG/G
0.1 CREAM TOPICAL TWICE DAILY
Qty: 1 | Refills: 0 | Status: DISCONTINUED | COMMUNITY
Start: 2021-03-04 | End: 2022-02-28

## 2022-02-28 NOTE — ASSESSMENT
[FreeTextEntry1] : 48 yo female with acute anal fissures. I discussed the pathogenesis of the fissure and prescribed NTG oint. Instructions for the NTG ointment given. I instructed the pt to call my office if she has no relief in her symptoms after 7-10 days of tx to arrange for Botox. \par RTO as needed.\par \par

## 2022-02-28 NOTE — HISTORY OF PRESENT ILLNESS
[FreeTextEntry1] : 47-year-old female here with a complaint of 2 weeks of anorectal pain. Has 1 formed/hard BM daily, straining, the pain is worse with BMs and lingers, BRBPR on toilet paper for 2 weeks. Denies prolapsing tissue. Good appetite, no nausea, no vomiting. Denies fever and chills. \par Colonoscopy 6/21/21 by Dr. Shaquille Spencer- non--bleeding internal hemorrhoids, no specimens collected. \par

## 2022-02-28 NOTE — CONSULT LETTER
[Dear  ___] : Dear ~VAISHNAVI, [Consult Letter:] : I had the pleasure of evaluating your patient, [unfilled]. [Please see my note below.] : Please see my note below. [Consult Closing:] : Thank you very much for allowing me to participate in the care of this patient.  If you have any questions, please do not hesitate to contact me. [Sincerely,] : Sincerely, [FreeTextEntry2] : Dr. Shaquille Spencer [FreeTextEntry3] : Anaid Mac M.D., F.A.C.S., F.MAGDALENE.S.C.R.S.\par Assistant Professor of Surgery\par [employer] [facility]\par \par

## 2022-02-28 NOTE — PHYSICAL EXAM
[FreeTextEntry1] : This is a 47 year-old well-developed female in no apparent distress.\par \par HEENT normocephalic, anicteric, external ears normal bilaterally, EOMs intact.\par \par Cardiac - regular rate and rhythm.\par \par Examination of the perineum reveals a posterior midline fissure tender to touch with Q-tip.\par Digital rectal examination reveals sphincter spasm. \par Anoscopy reveals small internal hemorrhoids and an additional anterior midline fissure.\par \par Neuro-cranial nerves grossly intact. Normal gait.\par \par Psychiatric-oriented to time place and person. Good understanding of conversation.

## 2022-09-06 ENCOUNTER — APPOINTMENT (OUTPATIENT)
Dept: ULTRASOUND IMAGING | Facility: CLINIC | Age: 47
End: 2022-09-06

## 2022-09-06 PROCEDURE — 76830 TRANSVAGINAL US NON-OB: CPT

## 2022-09-06 PROCEDURE — 76856 US EXAM PELVIC COMPLETE: CPT

## 2022-12-21 ENCOUNTER — EMERGENCY (EMERGENCY)
Facility: HOSPITAL | Age: 47
LOS: 1 days | Discharge: HOME | End: 2022-12-21
Attending: STUDENT IN AN ORGANIZED HEALTH CARE EDUCATION/TRAINING PROGRAM | Admitting: STUDENT IN AN ORGANIZED HEALTH CARE EDUCATION/TRAINING PROGRAM

## 2022-12-21 VITALS
RESPIRATION RATE: 16 BRPM | OXYGEN SATURATION: 100 % | DIASTOLIC BLOOD PRESSURE: 68 MMHG | HEART RATE: 66 BPM | TEMPERATURE: 98 F | SYSTOLIC BLOOD PRESSURE: 118 MMHG

## 2022-12-21 VITALS
OXYGEN SATURATION: 100 % | SYSTOLIC BLOOD PRESSURE: 152 MMHG | TEMPERATURE: 98 F | RESPIRATION RATE: 17 BRPM | DIASTOLIC BLOOD PRESSURE: 85 MMHG | HEART RATE: 104 BPM

## 2022-12-21 DIAGNOSIS — Z98.51 TUBAL LIGATION STATUS: Chronic | ICD-10-CM

## 2022-12-21 DIAGNOSIS — Z98.891 HISTORY OF UTERINE SCAR FROM PREVIOUS SURGERY: Chronic | ICD-10-CM

## 2022-12-21 LAB
ALBUMIN SERPL ELPH-MCNC: 4.1 G/DL — SIGNIFICANT CHANGE UP (ref 3.3–5)
ALP SERPL-CCNC: 54 U/L — SIGNIFICANT CHANGE UP (ref 40–120)
ALT FLD-CCNC: 23 U/L — SIGNIFICANT CHANGE UP (ref 4–33)
ANION GAP SERPL CALC-SCNC: 15 MMOL/L — HIGH (ref 7–14)
AST SERPL-CCNC: 35 U/L — HIGH (ref 4–32)
BASOPHILS # BLD AUTO: 0.05 K/UL — SIGNIFICANT CHANGE UP (ref 0–0.2)
BASOPHILS NFR BLD AUTO: 0.7 % — SIGNIFICANT CHANGE UP (ref 0–2)
BILIRUB SERPL-MCNC: 0.2 MG/DL — SIGNIFICANT CHANGE UP (ref 0.2–1.2)
BUN SERPL-MCNC: 9 MG/DL — SIGNIFICANT CHANGE UP (ref 7–23)
CALCIUM SERPL-MCNC: 9 MG/DL — SIGNIFICANT CHANGE UP (ref 8.4–10.5)
CHLORIDE SERPL-SCNC: 101 MMOL/L — SIGNIFICANT CHANGE UP (ref 98–107)
CO2 SERPL-SCNC: 20 MMOL/L — LOW (ref 22–31)
CREAT SERPL-MCNC: 0.54 MG/DL — SIGNIFICANT CHANGE UP (ref 0.5–1.3)
EGFR: 114 ML/MIN/1.73M2 — SIGNIFICANT CHANGE UP
EOSINOPHIL # BLD AUTO: 0.09 K/UL — SIGNIFICANT CHANGE UP (ref 0–0.5)
EOSINOPHIL NFR BLD AUTO: 1.3 % — SIGNIFICANT CHANGE UP (ref 0–6)
GLUCOSE SERPL-MCNC: 89 MG/DL — SIGNIFICANT CHANGE UP (ref 70–99)
HCT VFR BLD CALC: 40 % — SIGNIFICANT CHANGE UP (ref 34.5–45)
HGB BLD-MCNC: 13.3 G/DL — SIGNIFICANT CHANGE UP (ref 11.5–15.5)
IANC: 4.15 K/UL — SIGNIFICANT CHANGE UP (ref 1.8–7.4)
IMM GRANULOCYTES NFR BLD AUTO: 0.3 % — SIGNIFICANT CHANGE UP (ref 0–0.9)
LYMPHOCYTES # BLD AUTO: 2.2 K/UL — SIGNIFICANT CHANGE UP (ref 1–3.3)
LYMPHOCYTES # BLD AUTO: 31.3 % — SIGNIFICANT CHANGE UP (ref 13–44)
MCHC RBC-ENTMCNC: 29.4 PG — SIGNIFICANT CHANGE UP (ref 27–34)
MCHC RBC-ENTMCNC: 33.3 GM/DL — SIGNIFICANT CHANGE UP (ref 32–36)
MCV RBC AUTO: 88.3 FL — SIGNIFICANT CHANGE UP (ref 80–100)
MONOCYTES # BLD AUTO: 0.52 K/UL — SIGNIFICANT CHANGE UP (ref 0–0.9)
MONOCYTES NFR BLD AUTO: 7.4 % — SIGNIFICANT CHANGE UP (ref 2–14)
NEUTROPHILS # BLD AUTO: 4.15 K/UL — SIGNIFICANT CHANGE UP (ref 1.8–7.4)
NEUTROPHILS NFR BLD AUTO: 59 % — SIGNIFICANT CHANGE UP (ref 43–77)
NRBC # BLD: 0 /100 WBCS — SIGNIFICANT CHANGE UP (ref 0–0)
NRBC # FLD: 0 K/UL — SIGNIFICANT CHANGE UP (ref 0–0)
PLATELET # BLD AUTO: 250 K/UL — SIGNIFICANT CHANGE UP (ref 150–400)
POTASSIUM SERPL-MCNC: 4.7 MMOL/L — SIGNIFICANT CHANGE UP (ref 3.5–5.3)
POTASSIUM SERPL-SCNC: 4.7 MMOL/L — SIGNIFICANT CHANGE UP (ref 3.5–5.3)
PROT SERPL-MCNC: 7.4 G/DL — SIGNIFICANT CHANGE UP (ref 6–8.3)
RBC # BLD: 4.53 M/UL — SIGNIFICANT CHANGE UP (ref 3.8–5.2)
RBC # FLD: 12.5 % — SIGNIFICANT CHANGE UP (ref 10.3–14.5)
SODIUM SERPL-SCNC: 136 MMOL/L — SIGNIFICANT CHANGE UP (ref 135–145)
TROPONIN T, HIGH SENSITIVITY RESULT: <6 NG/L — SIGNIFICANT CHANGE UP
TSH SERPL-MCNC: 2.3 UIU/ML — SIGNIFICANT CHANGE UP (ref 0.27–4.2)
WBC # BLD: 7.03 K/UL — SIGNIFICANT CHANGE UP (ref 3.8–10.5)
WBC # FLD AUTO: 7.03 K/UL — SIGNIFICANT CHANGE UP (ref 3.8–10.5)

## 2022-12-21 PROCEDURE — 71046 X-RAY EXAM CHEST 2 VIEWS: CPT | Mod: 26

## 2022-12-21 PROCEDURE — 99284 EMERGENCY DEPT VISIT MOD MDM: CPT

## 2022-12-21 PROCEDURE — 93010 ELECTROCARDIOGRAM REPORT: CPT

## 2022-12-21 RX ORDER — ALPRAZOLAM 0.25 MG
0.25 TABLET ORAL ONCE
Refills: 0 | Status: DISCONTINUED | OUTPATIENT
Start: 2022-12-21 | End: 2022-12-21

## 2022-12-21 RX ORDER — ACETAMINOPHEN 500 MG
975 TABLET ORAL ONCE
Refills: 0 | Status: COMPLETED | OUTPATIENT
Start: 2022-12-21 | End: 2022-12-21

## 2022-12-21 RX ADMIN — Medication 0.25 MILLIGRAM(S): at 15:56

## 2022-12-21 RX ADMIN — Medication 975 MILLIGRAM(S): at 15:56

## 2022-12-21 NOTE — ED PROVIDER NOTE - PHYSICAL EXAMINATION
VITALS: reviewed  GEN: NAD, A & O x 4  HEAD/EYES: NCAT, EOMI, anicteric sclerae,   ENT: mucus membranes moist, oropharynx WNL, trachea midline,  RESP: lungs CTA with equal breath sounds bilaterally, L chest wall ttp, and atraumatic  CV: heart with reg rhythm S1, S2, distal pulses intact and symmetric bilaterally  ABDOMEN: normoactive bowel sounds, soft, nondistended, nontender, no palpable masses  : no CVAT  MSK: extremities atraumatic and nontender, no edema, no asymmetry.  SKIN: warm, dry, no rash, no bruising, no cyanosis. color appropriate for ethnicity  NEURO: alert, mentating appropriately, no facial asymmetry.   PSYCH: Affect appropriate

## 2022-12-21 NOTE — ED ADULT NURSE NOTE - OBJECTIVE STATEMENT
47 yr old female patient presented to the ER complains of chest pain  radiating to her left arm started today  around 2pm while she was on her break this afternoon. History of hypothyroidism  and taking synthroid 25mcg daily. Stated that she has been stressed at work after an incident that occurred yesterday at work. Patient was  lined and lab with 20ga Iv to left AC.  Medicated with Tylenol 975mg and Xanax 0.25mg PO.  Safety precaution being maintained, will continue to monitor.

## 2022-12-21 NOTE — ED ADULT TRIAGE NOTE - CHIEF COMPLAINT QUOTE
Pt employee in [employer]eds [employer] c/o chest pain with radiation down left arm starting 15 min ago. Pt teary in triage states under a lot of stress at work. PMH Hypothyroidism.

## 2022-12-21 NOTE — ED ADULT NURSE NOTE - NSFALLRSKPASTHIST_ED_ALL_ED
AVOID ANTI HISTAMINES       Understanding Contact Dermatitis     A cool, moist compress can help reduce itching.     Contact dermatitis is a common type of skin rash. Its caused by something that touches the skin and makes it irritated and inflamed. It can occur on skin on any part of the body, such as the face, neck, hands, arms, and legs. Contact dermatitis is not spread from person to person.  Often, the reaction of contact dermatitis occurs 1 to 2 days after contact with the offending agent.  How to say it  NAIMA-tact [id number]   What causes contact dermatitis?  Its caused by something that irritates the skin, or that creates an allergic reaction on the skin. People can get contact dermatitis from many kinds of things. These include:  · Plant oils in poison ivy, oak, and sumac  · Chemicals in household , solvents, and glue  · Chemicals in makeup, soap, laundry detergent, perfume, acne cream, and hair products  · Certain medicines, such as neomycin, bacitracin, benzocaine, and thimerosal  · Metals such as nickel, found in some jewelry and watch bands   · The sticky material on the back of bandages and tape (adhesive)  · Things that can cause tiny breaks in the skin, such as wood, fiberglass, metal tools, and plant thorns  · Rubber latex in surgical gloves and other medical supplies  Dermatitis can also be caused by the skin being damp for long periods of time. This can happen from washing your hands too often, or working with wet materials.  Symptoms of contact dermatitis  Symptoms can include skin that is:  · Blistered  · Burning  · Cracked  · Dry  · Itchy  · Painful  · Red  · Rough, thickened, and leathery  · Swollen  · Warm  The blisters may ooze fluid and form crusts.  Treatment for contact dermatitis  Treatment is done to help relieve itching and reduce inflammation. The rash should go away in a few days to a few weeks. Treatments include:  · Cool, moist compress. Use a clean damp cloth. Put  it on the area for 20 to 30 minutes, 5 to 6 times a day for the first 3 days.  · Steroid cream or ointment. You can apply this medicine several times a day on clean skin.  · Oral corticosteroid. Your healthcare provider may prescribe this medicine if you have severe skin symptoms on a large part of your body.  Your healthcare provider may give you a steroid injection instead of pills.  · Oral antihistamine. This medicine can help reduce itching.  · Colloidal oatmeal bath. Soaking in water with colloidal oatmeal can help soothe skin.  · Plain cream, lotion, or ointment. Cream, lotion, or ointment without medicine can help to soothe and protect your skin.  Living with contact dermatitis  Talk with your healthcare provider about what may have caused your contact dermatitis. Patch testing may help you figure out what caused the rash so you can avoid further contact with it. Once you learn what caused your rash, make sure to avoid that substance. If your skin comes into contact with it again, make sure to wash your skin right away. If you cant avoid the substance, wear gloves or other protective clothing before you touch it. Or use a cream, lotion, or ointment to protect your skin.  When to call your healthcare provider  Call your healthcare provider right away if you have any of these:  · Fever of 100.4°F (38°C) or higher, or as directed  · Symptoms that dont get better, or get worse  · New symptoms   Date Last Reviewed: 5/1/2016 © 2000-2017 [employer]. [address] [place] [address], [place] [address]. All rights reserved. This information is not intended as a substitute for professional medical care. Always follow your healthcare professional's instructions.        Self-Care for Skin Rashes     Pat your skin dry. Do not rub.     When your skin reacts to a substance your body is sensitive to, it can cause a rash. You can treat most rashes at home by keeping the skin clean and dry. Many rashes may get better  on their own within 2 to 3 days. You may need medical attention if your rash itches, drains, or hurts, particularly if the rash is getting worse.  What can cause a skin rash?  · Sun poisoning, caused by too much exposure to the sun  · An irritant or allergic reaction to a certain type of food, plant, or chemical, such as  shellfish, poison ivy, and or cleaning products  · An infection caused by a fungus (ringworm), virus (chickenpox), or bacteria (strep)  · Bites or infestation caused by insects or pests, such as ticks, lice, or mites  · Dry skin, which is often seen during the winter months and in older people  How can I control itching and skin damage?  · Take soothing lukewarm baths in a colloidal oatmeal product. You can buy this at the [employer].  · Do your best not to scratch. Clip fingernails short, especially in young children, to reduce skin damage if scratching does occur.  · Use moisturizing skin lotion instead of scratching your dry skin.  · Use sunscreen whenever going out into direct sun.  · Use only mild cleansing agents whenever possible.  · Wash with mild, nonirritating soap and warm water.  · Wear clothing that breathes, such as cotton shirts or canvas shoes.  · If fluid is seeping from the rash, cover it loosely with clean gauze to absorb the discharge.  · Many rashes are contagious. Prevent the rash from spreading to others by washing your hands often before or after touching others with any skin rash.  Use medicine  · Antihistamines such as diphenhydramine can help control itching. But use with caution because they can make you drowsy.  · Using over-the-counter hydrocortisone cream on small rashes may help reduce swelling and itching  · Most over-the-counter antifungal medicines can treat athletes foot and many other fungal infections of the skin.  Check with your healthcare provider  Call your healthcare provider if:  · You were told that you have a fungal infection on your skin to make sure  you have the correct type of medicine.  · You have questions or concerns about medicines or their side effects.     Call 911  Call 911 if either of these occur:  · Your tongue or lips start to swell  · You have difficulty breathing      Call your healthcare provider  Call your healthcare provider if any of these occur:  · Temperature of more than 101.0°F (38.3°C), or as directed  · Sore throat, a cough, or unusual fatigue  · Red, oozy, or painful rash gets worse. These are signs of infection.  · Rash covers your face, genitals, or most of your body  · Crusty sores or red rings that begin to spread  · You were exposed to someone who has a contagious rash, such as scabies or lice.  · Red bulls-eye rash with a white center (a sign of Lyme disease)  · You were told that you have resistant bacteria (MRSA) on your skin.   Date Last Reviewed: 5/12/2015  © 4096-5765 [employer]. [address] [place] [address], [place] [address]. All rights reserved. This information is not intended as a substitute for professional medical care. Always follow your healthcare professional's instructions.         no

## 2022-12-21 NOTE — ED PROVIDER NOTE - OBJECTIVE STATEMENT
46 yo F hypothyroidism presenting with c/o chest burning since taking her break this afternoon. Pt was seated when pain started. It has been constant and she notes it is in setting of stress. She reports stress at work yesterday and today. Denies exertional pain, no association with food intake. Not on OCPs, no recent travel. No cough/sob. No family hx early onset CAD/sudden cardiac death.

## 2022-12-21 NOTE — ED PROVIDER NOTE - NSFOLLOWUPINSTRUCTIONS_ED_ALL_ED_FT
You were seen and evaluated in the emergency department for chest pain. Your exam, laboratory findings, EKG, imaging and other assessments were reassuring. We did not find evidence for a dangerous cause of your pain. This does not mean your pain is not real or concerning, only that we could not find a dangerous or life-threatening cause. Please read the attached information sheets as they will provide useful information regarding your condition.    It is important to understand that while your workup was reassuring, no medical workup can completely exclude all concerning conditions. Therefore, we ask that you please return if you develop new or worsening pain, trouble breathing, fainting (or feel that you might faint), weakness, inability to perform your daily activities, or other concerning new symptoms (such as listed on the attached information sheets. Please read the attached information sheets. Take your medication as prescribed.    Please be sure to follow up with your regular doctor in 2-3 days.    Anxiety    Generalized anxiety disorder (RIANNA) is a mental disorder. It is defined as anxiety that is not necessarily related to specific events or activities or is out of proportion to said events. Symptoms include restlessness, fatigue, difficulty concentrations, irritability and difficulty concentrating. It may interfere with life functions, including relationships, work, and school. If you were started on a medication, make sure to take exactly as prescribed and follow up with a psychiatrist.    SEEK IMMEDIATE MEDICAL CARE IF YOU HAVE ANY OF THE FOLLOWING SYMPTOMS: thoughts about hurting killing yourself, thoughts about hurting or killing somebody else, hallucinations, or worsening depression.

## 2022-12-21 NOTE — ED PROVIDER NOTE - CLINICAL SUMMARY MEDICAL DECISION MAKING FREE TEXT BOX
48 yo F presenting with cp- nonspecific/burning, not typical for ACS, no PE rf, no ACS rf. plan for labs, xr. pt reports feeling anxious- tx xanax, reassess

## 2022-12-21 NOTE — ED PROVIDER NOTE - PATIENT PORTAL LINK FT
You can access the FollowMyHealth Patient Portal offered by [facility] by registering at the following website: [email or web address]. By joining [facility]’s FollowMyHealth portal, you will also be able to view your health information using other applications (apps) compatible with our system.

## 2023-02-16 NOTE — ED ADULT NURSE NOTE - NSFALLRSKOUTCOME_ED_ALL_ED
Post Acute Skilled Nursing Home Physician subsequent visit Note     Date of Service: 2/16/2023  Location seen at: [facility]  Subacute / Skilled Need: Rehabilitation    PCP: Reyes Grady MD   Patient Care Team:  Reyes Grady MD as PCP - General (Internal Medicine)  Juanis Torre MD as Post Acute Facility Provider: Physician (Geriatric Medicine)  Perla Guardado CNP as Post Acute Facility Provider: APC (Nurse Practitioner - Family)  Attending SNF MD: Juanis Torre MD     Shaun Stahl is a 89 year old male presenting to Post Acute alf for: Rehab.   History of Present Illness:     Patient admitted to [facility] on 2/5/2023 discharged on 2/11/2023    89-year-old with history of CHF, CKD, COPD, essential hypertension was admitted with shortness of breath.  Patient was found to have elevated troponins.  Echo showed ejection fraction around 25%, which is lower than before.  Patient however does not want further work-up. patient received IV diuresis.  Patient also had new onset A-fib.  Patient started on Eliquis.  Patient also had hypoxia, and back to room air prior to discharge.    The above is summarized from review of discharge summary    2/16/23-patient seen and examined today in his room.  Nursing just finished placing a Beatty in him and currently it is draining yellow urine.  Peripheral line placed for IV fluids.  Patient with persistent leukocytosis, started on levofloxacin for UTI.  Patient's FRANCESCA has been worsening.  Patient's terazosin, lisinopril and Lasix have been discontinued.  We will give fluids today.  Repeat labs tomorrow.  Repeat labs today showed worsening FRANCESCA but improved WBC.  Patient's chest x-ray-shows resolving atelectasis.  Urinalysis and culture are pending.  Patient complained to his son about constipation yesterday and son reached out to primary care doctor.  PCP sent me a message of this message.  I did discuss with the patient.  Patient states  that he feels better today and had a small bowel movement yesterday.  We will change patient's MiraLAX to daily and start patient on Senokot-S daily.  Patient also complained of sore throat today.  We will check COVID 19 swab.  Patient continues to have poor appetite, encourage patient to hydrate and drink as much as he can.  Patient has no complaints of chest pain, no shortness of breath, no dizziness, no cough.  Patient weights are stable    Patient's vitals from today reviewed and entered into chart. Pt's lab work reviewed and entered into chart.  Lab work ordered also for tomorrow.  D/W Nursing staff, all other concerns addressed.     Patient's therapy updates reviewed and entered into the chart  Current level of function-2/15/2023  bed mobility mod assist  Transfers-min assist  Gait-30 feet x 2 assist with roller walker  Lower body dressing-min assist  Toileting-min assist    Patient has a tentative discharge date 2/25/2023.    HISTORY  Past Medical History:   Diagnosis Date   • Aortic regurgitation    • BPH (benign prostatic hyperplasia)    • Essential hypertension    • Essential tremor    • Gets most care through [facility] vet   • Kidney stone    • Latent tuberculosis    • Mitral regurgitation      Social history   reports that he quit smoking about 32 years ago. His smoking use included cigarettes, pipe, and cigars. He started smoking about 90 years ago. He has a 58.00 pack-year smoking history. He has never used smokeless tobacco. He reports current alcohol use. He reports that he does not currently use drugs.    Family History   Problem Relation Age of Onset   • Natural Causes Mother    • Natural Causes Father      PROBLEM LIST:  Specialty Problems    None     DEPRESSION SCREENING:  PHQ 2/9 Test Results  0: Not at all  1: Several days  2: More than half the days  3: Nearly every day  Recent Review Flowsheet Data     Date 6/16/2022    Adult PHQ 2 Score 0    Adult PHQ 2 Interpretation No further screening  needed    Little interest or pleasure in activity? Not at all    Feeling down, depressed or hopeless? Not at all        DEPRESSION ASSESSMENT/PLAN:  Start and/or continue medication.  See orders for details.     ALLERGIES:  Allergies as of 02/16/2023 - Reviewed 09/19/2022   Allergen Reaction Noted   • Altaryl DIARRHEA 07/25/2008   • Antihistamines, chlorpheniramine-type Other (See Comments) 04/16/2001   • Bee venom DIARRHEA and Other (See Comments) 09/18/2001   • Seasonal Other (See Comments) 04/26/2021   • Topiramate Other (See Comments) 12/09/2010     CURRENT MEDICATIONS:   Current Outpatient Medications   Medication Sig Dispense Refill   • isosorbide mononitrate (IMDUR) 30 MG 24 hr tablet Take 30 mg by mouth daily.     • metoPROLOL succinate (TOPROL-XL) 50 MG 24 hr tablet Take 50 mg by mouth every 12 hours.     • docusate sodium (COLACE) 100 MG capsule Take 100 mg by mouth in the morning and 100 mg in the evening.     • apixaBAN (ELIQUIS) 2.5 MG Tab Take 2.5 mg by mouth every 12 hours.     • acetaminophen (TYLENOL) 325 MG tablet Take 650 mg by mouth every 4 hours as needed.     • lisinopril (ZESTRIL) 5 MG tablet Take 5 mg by mouth daily.     • terazosin (HYTRIN) 2 MG capsule 4 mg.     • furosemide (LASIX) 40 MG tablet Take 1 tablet by mouth daily.     • fluticasone (FLONASE) 50 MCG/ACT nasal spray Spray 2 sprays in each nostril daily. 16 g 3   • fluticasone-vilanterol (BREO ELLIPTA) 100-25 MCG/INH inhaler Inhale 1 puff into the lungs daily. 1 each 3   • sertraline (ZOLOFT) 100 MG tablet TAKE ONE AND ONE-HALF TABLETS BY MOUTH EVERY MORNING FOR POST TRAUMATIC STRESS DISORDER AND DEPRESSION     • OLANZapine (ZyPREXA) 10 MG tablet TAKE ONE TABLET BY MOUTH AT BEDTIME FOR MENTAL HEALTH     • primidone (MYSOLINE) 50 MG tablet TAKE TWO TABLETS BY MOUTH AT BEDTIME FOR HAND TREMORS     • ipratropium (ATROVENT) 0.03 % nasal spray INSTILL 1 SPRAY IN EACH NOSTRIL EVERY DAY AS NEEDED FOR NASAL SYMPTOMS     • Cholecalciferol  (vitamin D3) 25 mcg (1,000 units) capsule Take 25 mcg by mouth daily.        No current facility-administered medications for this visit.     Medications reviewed / reconciled: Yes    Current facility medications-MD review    Isosorbide mononitrate 30 mg daily  Metoprolol succinate 50 mg twice daily      Eliquis 2.5 twice daily  Olanzapine 10 mg nightly  Sertraline 150 mg daily  Mirtazapine 7.5 mg nightly  primidone 50 mg daily  MiraLAX 1 packet daily as needed  Docusate 100 mg daily  Tylenol 650 every 6 as needed  Vitamin D 1000 units daily  Breo Ellipta inhaler daily  Levofloxacin 500 daily x7 days    BASELINE FUNCTIONAL STATUS:  Independent    CURRENT FUNCTIONAL STATUS:  Weight bearing as tolerated (WBAT)    DIET:  Consistency: General   Type: cardiac diet  Appetite: Poor    REVIEW OF SYSTEMS:  Review of Systems   Constitutional: Positive for activity change and appetite change. Negative for chills and fever.   HENT: Positive for hearing loss and sore throat. Negative for congestion, ear discharge, ear pain, rhinorrhea, sinus pressure and sinus pain.    Eyes: Negative for photophobia and redness.   Respiratory: Negative for cough, shortness of breath and wheezing.    Cardiovascular: Negative for chest pain and palpitations.   Gastrointestinal: Positive for constipation. Negative for diarrhea, nausea and vomiting.   Endocrine: Negative for polydipsia, polyphagia and polyuria.   Genitourinary: Negative for frequency and urgency.   Musculoskeletal: Negative for arthralgias and back pain.   Skin: Negative for color change and rash.   Neurological: Positive for weakness. Negative for dizziness, light-headedness and headaches.   Psychiatric/Behavioral: Negative for agitation and sleep disturbance. The patient is not nervous/anxious.      PHYSICAL ASSESSMENT:  GENERAL : AAOx3, NAD,  HEENT: NC/AT, hard of hearing  NECK: supple  LUNGS: CTAB, no wheezes, rhonchi, or rales   CARDIOVASCULAR: RRR, S1,S2 heard, no  rubs/murmurs/gallops   ABDOMEN: soft, NT,ND, BS+.   NEUROLOGIC: no focal deficits, CN 2-12 grossly intact.  Moving all extremities equally  LE: no edema bilaterally.  SKIN: IV in place right upper extremity  Beatty in place draining yellow urine    VITALS:  Visit Vitals  BP 97/51   Pulse 100   Temp 96.8 °F (36 °C)   Resp 16   SpO2 95%     Pain Level 2 /10    LABS:  2/16/2023  CBC-WBC 14.27 hemoglobin 13.6 hematocrit 41.8 platelets 179  CMP-sodium 141 potassium 4.4 chloride 99 CO2 27  creatinine 2.82    2/15/2023-chest x-ray-resolving bilateral basilar [employer] thesis    2/15/2023  CBC-WBC 16.81 hemoglobin 13.3 hematocrit 40 platelets 180  CMP-sodium 142 potassium 4.2 chloride 101 CO2 27 BUN 99 creatinine 2.41 glucose 108    2/14/2023  CBC-WBC 16.61 hemoglobin 13.3 platelets 177  Sodium 144 potassium 4.5 chloride 103 CO2 27 BUN 85 creatinine 2.19 glucose 106    2/13/2023  CBC-WBCs 11.54 hemoglobin 13.6 hematocrit 41.3 platelets 2 2  CMP-sodium 146 potassium 4.5 chloride 104 CO2 26 BUN 65 creatinine 1.85 glucose 146      ASSESSMENT AND PLAN  #FRANCESCA on CKD 2  Avoid nephrotoxins, monitor weekly  Continue Lasix 40 daily  BUN 65 creatinine 1.85-->BUN 99 creatinine 2.41-->  creatinine 2.82 today  Patient's ACE inhibitor has been discontinued, Lasix has been discontinued, terazosin has been discontinued  Ordered IV fluids NS 500ml,  Beatty placed  Repeat CBC CMP tomorrow    #Leukocytosis-improving today  WBC 11.5--16.6--16.81-->14  Chest x-ray-unremarkable  Urinalysis-pending  Not been having any loose bowel movements  Started on levofloxacin 500 daily x7 days    #Acute on chronic systolic heart failure  Ejection fraction 25%  Continue beta-blocker, isosorbide mononitrate,   Diuretic-Lasix 40 mg daily-hold due to FRANCESCA  Continue fluid restriction, daily weights, salt restriction  No aspirin due to history of GI bleed, patient refused statin  Evaluate for ICD as outpatient, not interested in LifeVest  Euvolemic on  Universal Safety Interventions examination  Weight on admission-142 pounds--latest weight 140 pounds    #Acute respiratory failure-resolved    #New onset A-fib  Rate control-Metoprolol succinate 50 mg twice daily  Anticoagulation-Eliquis 2.5 twice daily    #COPD  Breo Ellipta inhaler daily    #Essential hypertension    Isosorbide mononitrate 30 mg daily  Metoprolol succinate 50 mg twice daily    Blood pressure well controlled today    #BPH-discontinue terazosin due to FRANCESCA    #Essential tremor-primidone 50 mg daily    #Depression  Olanzapine 10 mg nightly  Sertraline 150 mg daily    #Mild dementia    #Debility  Continue PT OT  Pain control-Tylenol 650 every 6 as needed  Bowel regimen-start Senokot S1 tablet daily, MiraLAX 1 packet daily    Current level of function-2/15/2023  bed mobility mod assist  Transfers-min assist  Gait-30 feet x 2 assist with roller walker  Lower body dressing-min assist  Toileting-min assist    #Ascending aortic dilatation-continue to monitor    #Mild protein calorie malnutrition  Dietitian consult  Started on mirtazapine 7.5 nightly    FOLLOW UP APPOINTMENTS:  PCP  Cardiology    DISCHARGE PLANNING: Home with family and home health on 2/25/2023    Discussed with: RN / Nursing, Family, Patient and Reviewed old records    Prognosis: good    Total time spent is more than 45 minutes, with more than 50% of the time spent in coordination of care, counseling, review of records and discussion of plan of care with the patient /staff /family.    Juanis Torre MD  Electronically signed by: Juanis Torre MD  2/16/2023

## 2023-09-28 NOTE — H&P PST ADULT - ALLERGY TYPES
Date of Service: 06/09/2021    CHIEF COMPLAINT:  The patient is 3 years old, is seen today in followup with the mother.  He has milk allergy.  He has chronic allergic rhinitis and chronic allergic conjunctivitis and intermittent asthma.    HISTORY OF PRESENT ILLNESS:  The patient has milk allergy with history of having anaphylaxis.  We reviewed his history and prior diagnostic testing in detail.  They have done an excellent job with avoidance measures and since I had last seen him on 12/07/2020 he has not had any accidental ingestion of milk and no food induced allergic reactions at all.  They are quite satisfied regarding this.  He has no other food allergies and tolerates egg, wheat, soy and peanut butter without any difficulties.  Overall, they are strictly avoiding milk and they have EpiPen Jorge 2 pack and antihistamine available at all times.  He has chronic allergic rhinitis and chronic allergic conjunctivitis.  Prior skin tests have been positive for cat, dog and multiple molds.  Zyrtec is working well for him.  He has intermittent clear rhinorrhea, sneezing and ocular pruritus and watery eyes bilaterally.  He has intermittent asthma but he is asymptomatic regarding this.  Since I had last seen him on 12/07/2020 they have not even needed to use the Albuterol, mother reports.  He is not having any shortness of breath, wheezing, chest tightness, or coughing at this time.  No nighttime awakenings, no functional limitations, no asthma exacerbations requiring ER visits, hospitalizations, or oral steroids since I last saw him on 12/07/2020.  With Band-Aid he had redness and swelling over the area where the Band-Aid was applied and their pediatrician, Dr. Davies raised concerns that this could be from latex.  Overall, again, this was only at the site of contact with redness and swelling over the area where the bandage was applied.  No generalized urticaria or angioedema such as tongue or throat swelling or 
respiratory symptoms.  No IgE mediated reaction at all.  Overall, they continue to stay away from Band-Aid and latex, and he has not had any further difficulties.  No stinging insect allergy.    DRUG ALLERGIES:  No known drug allergy.    CURRENT MEDICATIONS:  Reviewed, recorded in Epic.    SOCIAL HISTORY:  They report no passive smoke exposure.  They have 3 cats and 1 dog and again, he is allergic to cat and dog.  They do have 2 rats, but his testing regarding rats has come out negative.    REVIEW OF SYSTEMS:  As stated in HPI.    PHYSICAL EXAMINATION:  GENERAL:  The patient is in no acute distress.  VITAL SIGNS:  Weight is 19.3 kilograms.  HEENT:  Conjunctivae are not injected.  No ocular discharge or swelling.  He has normal nasal mucosa.  No edema, no rhinorrhea.  Oropharynx:  Moist and clear, no thrush, no angioedema.  LUNGS:  Respiratory effort is normal.  Lungs are clear to auscultation.  No wheezing.  HEART:  Regular rate and rhythm.  SKIN:  No urticaria, angioedema, or eczema.    IMPRESSION AND RECOMMENDATIONS:  1.  The patient has milk allergy with history of having anaphylaxis.  We reviewed his history and prior diagnostic testing results in detail.  The specific IgE for milk from 06/11/2020 is 66.7.  We will repeat specific IgE for milk as it has been 1 year since we did testing, and we will see what this shows.  Natural history of milk allergy discussed.  Continue strict avoidance of milk/dairy and food allergen avoidance again discussed in detail.  Certainly, discussed the risk of anaphylaxis again and mother has EpiPen Jorge 2 pack available at all times and emphasized indications and technique for using EpiPen Jorge 2 pack and criteria for seeking emergent medical care.  Always have antihistamine available, indications and dosage for using antihistamine discussed and emphasized to wear a medical alert bracelet.  Any questions or concerns, to let me know.  2.  The patient has chronic allergic 
rhinitis and chronic allergic conjunctivitis.  Prior skin testing has been positive for cat, dog and multiple molds.  Their pediatrician also checked specific IgE for dog, which was 3.09 and cat, which was 0.81 from 06/11/2020.  Overall, certainly continue allergen avoidance and environmental control and continue Zyrtec 5 mL daily.  This is working well for him and his chronic allergic rhinitis, chronic allergic conjunctivitis is well controlled, but more difficulties to let me know.  3.  He has intermittent asthma, which is mild and well controlled.  He is currently asymptomatic, actually no Albuterol needed since he last saw me on 12/07/2020.  They have Albuterol nebulizer available.  Mother does not feel an Albuterol inhaler as needed and they can use Albuterol nebulizer if needed, but if he needs this more than twice a week or if there are any asthma exacerbations, to let me know.  Goals of asthma control, asthma action plan, criteria for seeking emergent medical care discussed.  Certainly, any questions or concerns, I can be notified.  Certainly, follow up with Dr. Davies regarding general pediatric care.  I emphasized side effects of medications and proper way of using them.  Emphasized to get regular comprehensive eye exams through the eye doctor.  Above was discussed in detail and all questions were answered.      Dictated By: Alex Burdick MD  Signing Provider: MD FRANK Dominguez/APOLLO ([id number])  DD: 06/09/2021 11:18:52 TD: 06/11/2021 02:42:57    Copy Sent To:     cc: Efra Davies MD  
Complete
outdoor environmental allergies

## 2024-04-25 ENCOUNTER — APPOINTMENT (OUTPATIENT)
Dept: MRI IMAGING | Facility: CLINIC | Age: 49
End: 2024-04-25
Payer: COMMERCIAL

## 2024-04-25 PROCEDURE — 70553 MRI BRAIN STEM W/O & W/DYE: CPT

## 2024-04-25 PROCEDURE — A9585: CPT | Mod: JW

## 2024-08-09 ENCOUNTER — OFFICE (OUTPATIENT)
Dept: URBAN - METROPOLITAN AREA CLINIC 90 | Facility: CLINIC | Age: 49
Setting detail: OPHTHALMOLOGY
End: 2024-08-09
Payer: COMMERCIAL

## 2024-08-09 DIAGNOSIS — H52.7: ICD-10-CM

## 2024-08-09 DIAGNOSIS — H16.223: ICD-10-CM

## 2024-08-09 PROCEDURE — 92004 COMPRE OPH EXAM NEW PT 1/>: CPT | Performed by: OPHTHALMOLOGY

## 2024-08-09 ASSESSMENT — LID POSITION - PTOSIS
OD_PTOSIS: ABSENT
OS_PTOSIS: ABSENT

## 2024-08-09 ASSESSMENT — CONFRONTATIONAL VISUAL FIELD TEST (CVF)
OS_FINDINGS: FULL
OD_FINDINGS: FULL

## 2024-08-09 ASSESSMENT — LID EXAM ASSESSMENTS
OS_COMMENTS: IPF 9
OD_COMMENTS: IPF 9

## 2024-09-24 NOTE — ED ADULT NURSE NOTE - CHIEF COMPLAINT
Non diabetic pt here for ED visit follow up on 09/22/2024, R foot swelling x 1 week. Started Keflex and still has swelling after finishing the antibiotic. Patient denies any other issues at this time.     LOV: 06/25/2024  Chief Complaint: Nail Dystrophy  Plan: Debridement of nails    Denies known Latex allergy or symptoms of Latex sensitivity.  Allergies verified, no changes.  Tobacco use verified, no changes.  Medications verified, no changes.   Pt is on blood thinners: no    Patient last saw PCP on 07/16/2024            The patient is a 44y Female complaining of lacerations.

## 2025-01-29 ENCOUNTER — APPOINTMENT (OUTPATIENT)
Dept: SURGERY | Facility: CLINIC | Age: 50
End: 2025-01-29

## 2025-01-29 VITALS
DIASTOLIC BLOOD PRESSURE: 80 MMHG | RESPIRATION RATE: 17 BRPM | TEMPERATURE: 97.2 F | SYSTOLIC BLOOD PRESSURE: 117 MMHG | OXYGEN SATURATION: 98 % | HEART RATE: 67 BPM

## 2025-01-29 PROCEDURE — 46600 DIAGNOSTIC ANOSCOPY SPX: CPT

## 2025-02-17 PROBLEM — R19.8 ANAL DISCHARGE: Status: ACTIVE | Noted: 2025-02-17

## 2025-03-03 NOTE — H&P PST ADULT - HEART RATE (BEATS/MIN)
Patient : Karey Johns Age: 70 Estrella  Riya HODGE MD  [MM] Danielle Umaña MD    70 Riya De La Rosa MD  03/02/25 [id number]     [id number]

## 2025-07-16 ENCOUNTER — OUTPATIENT (OUTPATIENT)
Dept: OUTPATIENT SERVICES | Facility: HOSPITAL | Age: 50
LOS: 1 days | End: 2025-07-16

## 2025-07-16 VITALS
DIASTOLIC BLOOD PRESSURE: 70 MMHG | OXYGEN SATURATION: 100 % | HEART RATE: 61 BPM | SYSTOLIC BLOOD PRESSURE: 115 MMHG | TEMPERATURE: 98 F | WEIGHT: 136.03 LBS | RESPIRATION RATE: 16 BRPM | HEIGHT: 61.5 IN

## 2025-07-16 DIAGNOSIS — N92.4 EXCESSIVE BLEEDING IN THE PREMENOPAUSAL PERIOD: ICD-10-CM

## 2025-07-16 DIAGNOSIS — E03.9 HYPOTHYROIDISM, UNSPECIFIED: ICD-10-CM

## 2025-07-16 DIAGNOSIS — Z98.891 HISTORY OF UTERINE SCAR FROM PREVIOUS SURGERY: Chronic | ICD-10-CM

## 2025-07-16 DIAGNOSIS — Z98.51 TUBAL LIGATION STATUS: Chronic | ICD-10-CM

## 2025-07-16 LAB
HCT VFR BLD CALC: 35.8 % — SIGNIFICANT CHANGE UP (ref 34.5–45)
HGB BLD-MCNC: 12 G/DL — SIGNIFICANT CHANGE UP (ref 11.5–15.5)
MCHC RBC-ENTMCNC: 29.3 PG — SIGNIFICANT CHANGE UP (ref 27–34)
MCHC RBC-ENTMCNC: 33.5 G/DL — SIGNIFICANT CHANGE UP (ref 32–36)
MCV RBC AUTO: 87.5 FL — SIGNIFICANT CHANGE UP (ref 80–100)
NRBC # BLD AUTO: 0 K/UL — SIGNIFICANT CHANGE UP (ref 0–0)
NRBC # FLD: 0 K/UL — SIGNIFICANT CHANGE UP (ref 0–0)
NRBC BLD AUTO-RTO: 0 /100 WBCS — SIGNIFICANT CHANGE UP (ref 0–0)
PLATELET # BLD AUTO: 263 K/UL — SIGNIFICANT CHANGE UP (ref 150–400)
PMV BLD: 10.9 FL — SIGNIFICANT CHANGE UP (ref 7–13)
RBC # BLD: 4.09 M/UL — SIGNIFICANT CHANGE UP (ref 3.8–5.2)
RBC # FLD: 12.5 % — SIGNIFICANT CHANGE UP (ref 10.3–14.5)
WBC # BLD: 4.15 K/UL — SIGNIFICANT CHANGE UP (ref 3.8–10.5)
WBC # FLD AUTO: 4.15 K/UL — SIGNIFICANT CHANGE UP (ref 3.8–10.5)

## 2025-07-16 NOTE — H&P PST ADULT - ATTENDING COMMENTS
Pt seen and evaluated. Plan for D&C, operative hysteroscopy, removal of endometrial polyp. Risks, benefits discussed.  All questions answered.  Pt wishes to proceed. Pt seen and evaluated. Plan for D&C, operative hysteroscopy, possible removal of endometrial polyp. Risks, benefits discussed.  All questions answered.  Pt wishes to proceed.

## 2025-07-16 NOTE — H&P PST ADULT - HISTORY OF PRESENT ILLNESS
51y/o female presents for preop eval for scheduled hysteroscopy d&c..  Pt with c/o spotting  between menstrual cycle.  Pt states evaluated by GYN.  TVUS. done.  Preop dx Excessive bleeding in the premenopausal period & endometrial intraepithelial neoplasia.

## 2025-07-16 NOTE — H&P PST ADULT - PROBLEM SELECTOR PLAN 1
scheduled cofr hysteroscopy d&c  Written & verbal preop instructions,  Pt verbalized good understanding. scheduled for hysteroscopy d&c  Written & verbal preop instructions,  Pt verbalized good understanding.

## 2025-07-16 NOTE — H&P PST ADULT - NSICDXPASTMEDICALHX_GEN_ALL_CORE_FT
PAST MEDICAL HISTORY:  EIN (endometrial intraepithelial neoplasia)     Excessive bleeding in the premenopausal period     Hypothyroidism      PAST MEDICAL HISTORY:  EIN (endometrial intraepithelial neoplasia)     Elevated cholesterol     Excessive bleeding in the premenopausal period     Hypothyroidism

## 2025-07-18 NOTE — ASU PATIENT PROFILE, ADULT - FALL HARM RISK - UNIVERSAL INTERVENTIONS
Bed in lowest position, wheels locked, appropriate side rails in place/Call bell, personal items and telephone in reach/Instruct patient to call for assistance before getting out of bed or chair/Non-slip footwear when patient is out of bed/[place] to call system/Physically safe environment - no spills, clutter or unnecessary equipment/Purposeful Proactive Rounding/Room/bathroom lighting operational, light cord in reach

## 2025-07-21 ENCOUNTER — RESULT REVIEW (OUTPATIENT)
Age: 50
End: 2025-07-21

## 2025-07-21 ENCOUNTER — TRANSCRIPTION ENCOUNTER (OUTPATIENT)
Age: 50
End: 2025-07-21

## 2025-07-21 ENCOUNTER — OUTPATIENT (OUTPATIENT)
Dept: OUTPATIENT SERVICES | Facility: HOSPITAL | Age: 50
LOS: 1 days | End: 2025-07-21
Payer: COMMERCIAL

## 2025-07-21 VITALS
RESPIRATION RATE: 15 BRPM | DIASTOLIC BLOOD PRESSURE: 66 MMHG | SYSTOLIC BLOOD PRESSURE: 120 MMHG | TEMPERATURE: 98 F | HEART RATE: 58 BPM | HEIGHT: 61 IN | OXYGEN SATURATION: 100 % | WEIGHT: 136.03 LBS

## 2025-07-21 VITALS
SYSTOLIC BLOOD PRESSURE: 100 MMHG | OXYGEN SATURATION: 100 % | DIASTOLIC BLOOD PRESSURE: 65 MMHG | HEART RATE: 58 BPM | RESPIRATION RATE: 18 BRPM

## 2025-07-21 DIAGNOSIS — Z98.891 HISTORY OF UTERINE SCAR FROM PREVIOUS SURGERY: Chronic | ICD-10-CM

## 2025-07-21 DIAGNOSIS — Z98.51 TUBAL LIGATION STATUS: Chronic | ICD-10-CM

## 2025-07-21 DIAGNOSIS — N92.4 EXCESSIVE BLEEDING IN THE PREMENOPAUSAL PERIOD: ICD-10-CM

## 2025-07-21 LAB — HCG UR QL: NEGATIVE — SIGNIFICANT CHANGE UP

## 2025-07-21 PROCEDURE — 88305 TISSUE EXAM BY PATHOLOGIST: CPT | Mod: 26

## 2025-08-06 LAB — SURGICAL PATHOLOGY STUDY: SIGNIFICANT CHANGE UP

## 2025-08-07 ENCOUNTER — NON-APPOINTMENT (OUTPATIENT)
Age: 50
End: 2025-08-07

## 2025-09-16 ENCOUNTER — NON-APPOINTMENT (OUTPATIENT)
Age: 50
End: 2025-09-16

## 2025-09-18 ENCOUNTER — APPOINTMENT (OUTPATIENT)
Dept: GYNECOLOGIC ONCOLOGY | Facility: CLINIC | Age: 50
End: 2025-09-18
Payer: COMMERCIAL

## 2025-09-18 VITALS
DIASTOLIC BLOOD PRESSURE: 80 MMHG | TEMPERATURE: 98 F | BODY MASS INDEX: 25.13 KG/M2 | WEIGHT: 128 LBS | HEART RATE: 56 BPM | HEIGHT: 60 IN | SYSTOLIC BLOOD PRESSURE: 120 MMHG | OXYGEN SATURATION: 98 %

## 2025-09-18 DIAGNOSIS — Z80.3 FAMILY HISTORY OF MALIGNANT NEOPLASM OF BREAST: ICD-10-CM

## 2025-09-18 DIAGNOSIS — N85.02 ENDOMETRIAL INTRAEPITHELIAL NEOPLASIA [EIN]: ICD-10-CM

## 2025-09-18 DIAGNOSIS — K21.9 GASTRO-ESOPHAGEAL REFLUX DISEASE W/OUT ESOPHAGITIS: ICD-10-CM

## 2025-09-18 DIAGNOSIS — R80.9 PROTEINURIA, UNSPECIFIED: ICD-10-CM

## 2025-09-18 DIAGNOSIS — D49.59 NEOPLASM UNSPECIFIED BEHAVIOR OF OTHER GENITOURINARY ORGAN: ICD-10-CM

## 2025-09-18 DIAGNOSIS — Z80.41 FAMILY HISTORY OF MALIGNANT NEOPLASM OF OVARY: ICD-10-CM

## 2025-09-18 DIAGNOSIS — R31.29 OTHER MICROSCOPIC HEMATURIA: ICD-10-CM

## 2025-09-18 PROCEDURE — 99205 OFFICE O/P NEW HI 60 MIN: CPT

## 2025-09-18 PROCEDURE — 99459 PELVIC EXAMINATION: CPT

## 2025-09-18 RX ORDER — LEVOTHYROXINE SODIUM 0.03 MG/1
25 TABLET ORAL
Refills: 0 | Status: ACTIVE | COMMUNITY

## (undated) DEVICE — VISITEC 4X4

## (undated) DEVICE — PACK D&C

## (undated) DEVICE — ACCESSORY AVETA WASTE MANAGEMENT 3MM

## (undated) DEVICE — TUBING SUCTION NONCONDUCTIVE 6MM X 12FT

## (undated) DEVICE — DRSG PAD SANITARY OB

## (undated) DEVICE — AVETA CORAL HYSTEROSCOPE 4.6MM DISP

## (undated) DEVICE — BASIN SET DOUBLE

## (undated) DEVICE — TUBING IRR SET FOR CYSTOSCOPY 77"

## (undated) DEVICE — DRSG TELFA 3 X 8

## (undated) DEVICE — PREP BETADINE KIT

## (undated) DEVICE — DRAPE LIGHT HANDLE COVER (GREEN)

## (undated) DEVICE — PROTECTOR HEEL / ELBOW FLUFFY

## (undated) DEVICE — LABELS BLANK W PEN

## (undated) DEVICE — SOL IRR POUR NS 0.9% 1000ML

## (undated) DEVICE — PRESSURE INFUSOR BAG 1000ML

## (undated) DEVICE — DRAPE GYN IRRIGATION POUCH 19 X 23"

## (undated) DEVICE — POSITIONER STRAP ARMBOARD VELCRO TS-30

## (undated) DEVICE — WARMING BLANKET FULL ADULT

## (undated) DEVICE — AVETA FLUID MANAGEMENT ACCESSORY

## (undated) DEVICE — VENODYNE/SCD SLEEVE CALF MEDIUM

## (undated) DEVICE — DRAPE TOWEL BLUE 17" X 24"

## (undated) DEVICE — GOWN LG

## (undated) DEVICE — SOL IRR POUR H2O 500ML